# Patient Record
Sex: FEMALE | Race: BLACK OR AFRICAN AMERICAN | NOT HISPANIC OR LATINO | Employment: FULL TIME | URBAN - METROPOLITAN AREA
[De-identification: names, ages, dates, MRNs, and addresses within clinical notes are randomized per-mention and may not be internally consistent; named-entity substitution may affect disease eponyms.]

---

## 2022-02-11 ENCOUNTER — HOSPITAL ENCOUNTER (EMERGENCY)
Facility: CLINIC | Age: 32
Discharge: HOME | End: 2022-02-11
Attending: EMERGENCY MEDICINE | Admitting: EMERGENCY MEDICINE
Payer: MEDICAID

## 2022-02-11 ENCOUNTER — APPOINTMENT (OUTPATIENT)
Dept: CT IMAGING | Facility: CLINIC | Age: 32
End: 2022-02-11
Attending: EMERGENCY MEDICINE
Payer: MEDICAID

## 2022-02-11 ENCOUNTER — APPOINTMENT (OUTPATIENT)
Dept: ULTRASOUND IMAGING | Facility: CLINIC | Age: 32
End: 2022-02-11
Attending: EMERGENCY MEDICINE
Payer: MEDICAID

## 2022-02-11 VITALS
HEART RATE: 84 BPM | OXYGEN SATURATION: 100 % | RESPIRATION RATE: 16 BRPM | SYSTOLIC BLOOD PRESSURE: 121 MMHG | DIASTOLIC BLOOD PRESSURE: 73 MMHG

## 2022-02-11 DIAGNOSIS — N20.1 RIGHT URETERAL STONE: ICD-10-CM

## 2022-02-11 DIAGNOSIS — R10.9 FLANK PAIN: ICD-10-CM

## 2022-02-11 PROBLEM — E61.1 IRON DEFICIENCY: Status: ACTIVE | Noted: 2022-02-05

## 2022-02-11 PROBLEM — Z85.850 HISTORY OF MALIGNANT NEOPLASM OF THYROID: Status: ACTIVE | Noted: 2022-02-11

## 2022-02-11 PROBLEM — E66.9 OBESITY: Status: ACTIVE | Noted: 2022-02-11

## 2022-02-11 PROBLEM — N20.0 KIDNEY STONE: Status: ACTIVE | Noted: 2019-03-04

## 2022-02-11 PROBLEM — Z11.52 ENCOUNTER FOR SCREENING FOR COVID-19: Status: ACTIVE | Noted: 2022-02-11

## 2022-02-11 PROBLEM — Z86.19 HISTORY OF CHLAMYDIA: Status: ACTIVE | Noted: 2022-02-05

## 2022-02-11 PROBLEM — E89.0 S/P COMPLETE THYROIDECTOMY: Status: ACTIVE | Noted: 2017-06-09

## 2022-02-11 PROBLEM — G47.33 OBSTRUCTIVE SLEEP APNEA SYNDROME: Status: ACTIVE | Noted: 2022-02-11

## 2022-02-11 PROBLEM — D50.9 IRON DEFICIENCY ANEMIA: Status: ACTIVE | Noted: 2017-06-09

## 2022-02-11 PROBLEM — E03.9 HYPOTHYROIDISM: Status: ACTIVE | Noted: 2022-02-11

## 2022-02-11 PROBLEM — R23.3 EASY BRUISING: Status: ACTIVE | Noted: 2022-02-05

## 2022-02-11 PROBLEM — C73 PAPILLARY THYROID CARCINOMA (H): Status: ACTIVE | Noted: 2017-06-09

## 2022-02-11 LAB
ALBUMIN SERPL-MCNC: 3.6 G/DL (ref 3.5–5)
ALBUMIN UR-MCNC: 10 MG/DL
ALP SERPL-CCNC: 109 U/L (ref 45–120)
ALT SERPL W P-5'-P-CCNC: 31 U/L (ref 0–45)
ANION GAP SERPL CALCULATED.3IONS-SCNC: 9 MMOL/L (ref 5–18)
APPEARANCE UR: CLEAR
AST SERPL W P-5'-P-CCNC: 18 U/L (ref 0–40)
BILIRUB DIRECT SERPL-MCNC: 0.2 MG/DL
BILIRUB SERPL-MCNC: 0.5 MG/DL (ref 0–1)
BILIRUB UR QL STRIP: NEGATIVE
BUN SERPL-MCNC: 9 MG/DL (ref 8–22)
CALCIUM SERPL-MCNC: 9.2 MG/DL (ref 8.5–10.5)
CHLORIDE BLD-SCNC: 109 MMOL/L (ref 98–107)
CO2 SERPL-SCNC: 21 MMOL/L (ref 22–31)
COLOR UR AUTO: YELLOW
CREAT SERPL-MCNC: 0.82 MG/DL (ref 0.6–1.1)
ERYTHROCYTE [DISTWIDTH] IN BLOOD BY AUTOMATED COUNT: 12.8 % (ref 10–15)
GFR SERPL CREATININE-BSD FRML MDRD: >90 ML/MIN/1.73M2
GLUCOSE BLD-MCNC: 107 MG/DL (ref 70–125)
GLUCOSE UR STRIP-MCNC: NEGATIVE MG/DL
HCG UR QL: NEGATIVE
HCT VFR BLD AUTO: 44.7 % (ref 35–47)
HGB BLD-MCNC: 14.6 G/DL (ref 11.7–15.7)
HGB UR QL STRIP: ABNORMAL
HOLD SPECIMEN: NORMAL
INTERNAL QC OK POCT: NORMAL
KETONES UR STRIP-MCNC: 40 MG/DL
LEUKOCYTE ESTERASE UR QL STRIP: NEGATIVE
MCH RBC QN AUTO: 30 PG (ref 26.5–33)
MCHC RBC AUTO-ENTMCNC: 32.7 G/DL (ref 31.5–36.5)
MCV RBC AUTO: 92 FL (ref 78–100)
MUCOUS THREADS #/AREA URNS LPF: PRESENT /LPF
NITRATE UR QL: NEGATIVE
PH UR STRIP: 7 [PH] (ref 5–7)
PLATELET # BLD AUTO: 420 10E3/UL (ref 150–450)
POCT KIT EXPIRATION DATE: NORMAL
POCT KIT LOT NUMBER: NORMAL
POTASSIUM BLD-SCNC: 3.8 MMOL/L (ref 3.5–5)
PROT SERPL-MCNC: 7.6 G/DL (ref 6–8)
RBC # BLD AUTO: 4.87 10E6/UL (ref 3.8–5.2)
RBC URINE: 62 /HPF
SODIUM SERPL-SCNC: 139 MMOL/L (ref 136–145)
SP GR UR STRIP: 1.02 (ref 1–1.03)
SQUAMOUS EPITHELIAL: 2 /HPF
UROBILINOGEN UR STRIP-MCNC: <2 MG/DL
WBC # BLD AUTO: 13.2 10E3/UL (ref 4–11)
WBC URINE: 3 /HPF

## 2022-02-11 PROCEDURE — 76705 ECHO EXAM OF ABDOMEN: CPT

## 2022-02-11 PROCEDURE — 96374 THER/PROPH/DIAG INJ IV PUSH: CPT

## 2022-02-11 PROCEDURE — 81001 URINALYSIS AUTO W/SCOPE: CPT | Performed by: EMERGENCY MEDICINE

## 2022-02-11 PROCEDURE — 96375 TX/PRO/DX INJ NEW DRUG ADDON: CPT

## 2022-02-11 PROCEDURE — 36415 COLL VENOUS BLD VENIPUNCTURE: CPT | Performed by: EMERGENCY MEDICINE

## 2022-02-11 PROCEDURE — 82040 ASSAY OF SERUM ALBUMIN: CPT | Performed by: EMERGENCY MEDICINE

## 2022-02-11 PROCEDURE — 85014 HEMATOCRIT: CPT | Performed by: EMERGENCY MEDICINE

## 2022-02-11 PROCEDURE — 258N000003 HC RX IP 258 OP 636: Performed by: EMERGENCY MEDICINE

## 2022-02-11 PROCEDURE — 82310 ASSAY OF CALCIUM: CPT | Performed by: EMERGENCY MEDICINE

## 2022-02-11 PROCEDURE — 96376 TX/PRO/DX INJ SAME DRUG ADON: CPT

## 2022-02-11 PROCEDURE — 96361 HYDRATE IV INFUSION ADD-ON: CPT

## 2022-02-11 PROCEDURE — 81025 URINE PREGNANCY TEST: CPT | Performed by: EMERGENCY MEDICINE

## 2022-02-11 PROCEDURE — 99285 EMERGENCY DEPT VISIT HI MDM: CPT | Mod: 25

## 2022-02-11 PROCEDURE — 250N000011 HC RX IP 250 OP 636: Performed by: EMERGENCY MEDICINE

## 2022-02-11 PROCEDURE — 74176 CT ABD & PELVIS W/O CONTRAST: CPT

## 2022-02-11 RX ORDER — HYDROMORPHONE HYDROCHLORIDE 1 MG/ML
0.5 INJECTION, SOLUTION INTRAMUSCULAR; INTRAVENOUS; SUBCUTANEOUS ONCE
Status: COMPLETED | OUTPATIENT
Start: 2022-02-11 | End: 2022-02-11

## 2022-02-11 RX ORDER — HYDROMORPHONE HCL IN WATER/PF 6 MG/30 ML
0.5 PATIENT CONTROLLED ANALGESIA SYRINGE INTRAVENOUS
Status: DISCONTINUED | OUTPATIENT
Start: 2022-02-11 | End: 2022-02-11 | Stop reason: CLARIF

## 2022-02-11 RX ORDER — ONDANSETRON 2 MG/ML
4 INJECTION INTRAMUSCULAR; INTRAVENOUS ONCE
Status: COMPLETED | OUTPATIENT
Start: 2022-02-11 | End: 2022-02-11

## 2022-02-11 RX ORDER — KETOROLAC TROMETHAMINE 15 MG/ML
15 INJECTION, SOLUTION INTRAMUSCULAR; INTRAVENOUS ONCE
Status: COMPLETED | OUTPATIENT
Start: 2022-02-11 | End: 2022-02-11

## 2022-02-11 RX ORDER — HYDROMORPHONE HYDROCHLORIDE 1 MG/ML
0.5 INJECTION, SOLUTION INTRAMUSCULAR; INTRAVENOUS; SUBCUTANEOUS
Status: COMPLETED | OUTPATIENT
Start: 2022-02-11 | End: 2022-02-11

## 2022-02-11 RX ORDER — ONDANSETRON 4 MG/1
4 TABLET, ORALLY DISINTEGRATING ORAL EVERY 6 HOURS PRN
Qty: 10 TABLET | Refills: 0 | Status: SHIPPED | OUTPATIENT
Start: 2022-02-11

## 2022-02-11 RX ORDER — OXYCODONE HYDROCHLORIDE 5 MG/1
5 TABLET ORAL EVERY 6 HOURS PRN
Qty: 16 TABLET | Refills: 0 | Status: SHIPPED | OUTPATIENT
Start: 2022-02-11 | End: 2022-02-14

## 2022-02-11 RX ADMIN — HYDROMORPHONE HYDROCHLORIDE 0.5 MG: 1 INJECTION, SOLUTION INTRAMUSCULAR; INTRAVENOUS; SUBCUTANEOUS at 11:14

## 2022-02-11 RX ADMIN — KETOROLAC TROMETHAMINE 15 MG: 15 INJECTION, SOLUTION INTRAMUSCULAR; INTRAVENOUS at 09:45

## 2022-02-11 RX ADMIN — HYDROMORPHONE HYDROCHLORIDE 0.5 MG: 1 INJECTION, SOLUTION INTRAMUSCULAR; INTRAVENOUS; SUBCUTANEOUS at 09:58

## 2022-02-11 RX ADMIN — SODIUM CHLORIDE 1000 ML: 9 INJECTION, SOLUTION INTRAVENOUS at 09:44

## 2022-02-11 RX ADMIN — ONDANSETRON 4 MG: 2 INJECTION INTRAMUSCULAR; INTRAVENOUS at 11:13

## 2022-02-11 RX ADMIN — ONDANSETRON 4 MG: 2 INJECTION INTRAMUSCULAR; INTRAVENOUS at 09:44

## 2022-02-11 ASSESSMENT — ENCOUNTER SYMPTOMS
ABDOMINAL PAIN: 1
FLANK PAIN: 1
VOMITING: 1

## 2022-02-11 NOTE — ED TRIAGE NOTES
Pt arrives to ED with c/o sudden onset of flank pain, nausea, and vomiting this morning while on the way to work. Hx of kidney stones. Pt rates pain 8/10. Pt kneeling at bedside states she cannot lay in bed. Pt very restless.

## 2022-02-11 NOTE — ED PROVIDER NOTES
EMERGENCY DEPARTMENT ENCOUNTER      NAME: Mirian De  AGE: 32 year old female  YOB: 1990  MRN: [id number]  EVALUATION DATE & TIME: 2/11/2022  9:20 AM    PCP: No primary care provider on file.    ED PROVIDER: Vahe Vega M.D.      Chief Complaint   Patient presents with     Flank Pain         FINAL IMPRESSION:  1. Right ureteral stone    2. Flank pain          ED COURSE & MEDICAL DECISION MAKING:    Pertinent Labs & Imaging studies reviewed. (See chart for details)  ED Course as of 02/11/22 1223   Fri Feb 11, 2022   0941 Patient is a 32-year-old woman with history of kidney stones who presents with sudden onset of right flank pain an hour and a half ago.  Associated vomiting.  She is very uncomfortable on arrival here, kneeling on the floor.  She states that she has had a recent period, very unlikely that she is pregnant.  Plan to give IV analgesia, antiemetics, CT scan.   1032 On my initial read of CT, there appears to be 3.5 mm distal ureteral stone with hydro on the right side   1106 Hepatic function panel  normal   1107 Abd/pelvis CT no contrast - Stone Protocol  1.  Moderate right hydronephrosis secondary to a 2 mm UVJ stone.  2.  Multiple bilateral renal nonobstructive stones.  3.  Left inferior renal 11 mm hyperdense lesion is most likely a complex cyst. Ultrasound recommended to confirm.   1125 Abdomen US, limited (RUQ only)  1.  Cholecystectomy.      2.  No biliary dictation. The partially visualized common bile duct shows no filling defects.     3.  Moderate right hydronephrosis, as seen on same-day CT.     4.  Remaining exam negative.   1127 Patient feeling better after second dose of analgesia and antiemetics.  We discussed CT scan, ultrasound, and plan for discharge.  She was then home with NSAIDs, antibiotics, few doses of oxycodone and kidney stones institute follow-up     UA not c/w UTI    Additional ED Course Timestamps:  9:30 AM I met with patient for initial interview and  encounter. PPE worn includes N95 mask and face shield.    At the conclusion of the encounter I discussed the results of all of the tests and the disposition. The questions were answered. The patient or family acknowledged understanding and was agreeable with the care plan.       MEDICATIONS GIVEN IN THE EMERGENCY:  Medications   ketorolac (TORADOL) injection 15 mg (15 mg Intravenous Given 2/11/22 0945)   0.9% sodium chloride BOLUS (0 mLs Intravenous Stopped 2/11/22 1159)   ondansetron (ZOFRAN) injection 4 mg (4 mg Intravenous Given 2/11/22 0944)   HYDROmorphone (PF) (DILAUDID) injection 0.5 mg (0.5 mg Intravenous Given 2/11/22 0958)   HYDROmorphone (PF) (DILAUDID) injection 0.5 mg (0.5 mg Intravenous Given 2/11/22 1114)   ondansetron (ZOFRAN) injection 4 mg (4 mg Intravenous Given 2/11/22 1113)         NEW PRESCRIPTIONS STARTED AT TODAY'S ER VISIT  Discharge Medication List as of 2/11/2022 11:59 AM      START taking these medications    Details   ondansetron (ZOFRAN-ODT) 4 MG ODT tab Take 1 tablet (4 mg) by mouth every 6 hours as needed for nausea, Disp-10 tablet, R-0, E-Prescribe      oxyCODONE (ROXICODONE) 5 MG tablet Take 1 tablet (5 mg) by mouth every 6 hours as needed for pain, Disp-16 tablet, R-0, E-Prescribe                =================================================================    HPI    Patient information was obtained from: Patient     Use of : N/A         Mirian De is a 32 year old female with a pertinent history of kidney stones who presents to this ED for evaluation of flank pain.    Patient reports she had sudden onset of right flank pain earlier this morning while driving to work. States that she had one episode of emesis at work before leaving to present to the ED. She notes that she has not had a kidney stone for about a year, but her symptoms present very similar to her previous stones. She also notes a ~2 week history of RUQ pain which is separate from her current flank  pain. States that she has had her gallbladder removed. Patient denies any other complaints.               REVIEW OF SYSTEMS   Review of Systems   Gastrointestinal: Positive for abdominal pain (RUQ, 2 weeks) and vomiting.   Genitourinary: Positive for flank pain (right sided).   All other systems reviewed and are negative.       PAST MEDICAL HISTORY:  History reviewed. No pertinent past medical history.    PAST SURGICAL HISTORY:  History reviewed. No pertinent surgical history.        CURRENT MEDICATIONS:    No current facility-administered medications for this encounter.     Current Outpatient Medications   Medication     ondansetron (ZOFRAN-ODT) 4 MG ODT tab     oxyCODONE (ROXICODONE) 5 MG tablet       ALLERGIES:  No Known Allergies    FAMILY HISTORY:  No family history on file.    SOCIAL HISTORY:   Social History     Socioeconomic History     Marital status: Not on file     Spouse name: Not on file     Number of children: Not on file     Years of education: Not on file     Highest education level: Not on file   Occupational History     Not on file   Tobacco Use     Smoking status: Not on file     Smokeless tobacco: Not on file   Substance and Sexual Activity     Alcohol use: Not on file     Drug use: Not on file     Sexual activity: Not on file   Other Topics Concern     Not on file   Social History Narrative     Not on file     Social Determinants of Health     Financial Resource Strain: Not on file   Food Insecurity: Not on file   Transportation Needs: Not on file   Physical Activity: Not on file   Stress: Not on file   Social Connections: Not on file   Intimate Partner Violence: Not on file   Housing Stability: Not on file       VITALS:  /73   Pulse 84   Resp 16   LMP 01/17/2022 (Exact Date)   SpO2 100%     PHYSICAL EXAM    Constitutional: Well developed, well nourished. Kneeling on floor grimacing in pain   HENT: Normocephalic, atraumatic, mucous membranes moist, nose normal. Neck- Supple, gross ROM  intact.   Eyes: Pupils mid-range, conjunctiva without injection, no discharge.   Respiratory: Clear to auscultation bilaterally, no respiratory distress, no wheezing, speaks full sentences easily. No cough.  Cardiovascular: Normal heart rate, regular rhythm, no murmurs. No pedal edema, 2+ DP pulses.   GI: Soft, right upper quadrant tenderness, no masses.   Musculoskeletal: Moving all 4 extremities intentionally and without pain. No obvious deformity.  Skin: Warm, dry, no rash.  Neurologic: Alert & oriented x 3, cranial nerves grossly intact.  Psychiatric: Affect normal, cooperative.       LAB:  All pertinent labs reviewed and interpreted.  Labs Ordered and Resulted from Time of ED Arrival to Time of ED Departure   BASIC METABOLIC PANEL - Abnormal       Result Value    Sodium 139      Potassium 3.8      Chloride 109 (*)     Carbon Dioxide (CO2) 21 (*)     Anion Gap 9      Urea Nitrogen 9      Creatinine 0.82      Calcium 9.2      Glucose 107      GFR Estimate >90     CBC WITH PLATELETS - Abnormal    WBC Count 13.2 (*)     RBC Count 4.87      Hemoglobin 14.6      Hematocrit 44.7      MCV 92      MCH 30.0      MCHC 32.7      RDW 12.8      Platelet Count 420     HEPATIC FUNCTION PANEL - Normal    Bilirubin Total 0.5      Bilirubin Direct 0.2      Protein Total 7.6      Albumin 3.6      Alkaline Phosphatase 109      AST 18      ALT 31     HCG QUALITATIVE URINE POCT - Normal    HCG Qual Urine Negative      Internal QC Check POCT Valid      POCT Kit Lot Number [id number]      POCT Kit Expiration Date 05/31/2023         RADIOLOGY:  Reviewed all pertinent imaging. Please see official radiology report.  Abdomen US, limited (RUQ only)   Final Result   IMPRESSION:      1.  Cholecystectomy.       2.  No biliary dictation. The partially visualized common bile duct shows no filling defects.      3.  Moderate right hydronephrosis, as seen on same-day CT.      4.  Remaining exam negative.            Abd/pelvis CT no contrast - Stone  Protocol   Final Result   IMPRESSION:    1.  Moderate right hydronephrosis secondary to a 2 mm UVJ stone.   2.  Multiple bilateral renal nonobstructive stones.   3.  Left inferior renal 11 mm hyperdense lesion is most likely a complex cyst. Ultrasound recommended to confirm.             EKG:    All EKG interpretations will be found in ED course above.      I, Salvador Grigsby am serving as a scribe to document services personally performed by Dr. Vahe Vega based on my observation and the provider's statements to me. I, Vahe Vega MD attest that Salvador Grigsby is acting in a scribe capacity, has observed my performance of the services and has documented them in accordance with my direction.    Vahe Vega M.D.  Emergency Medicine  [facility] EMERGENCY ROOM  [address] [facility] [id number]  [phone]  Dept: [phone]     Vahe Vega MD  02/11/22 1224

## 2022-02-11 NOTE — DISCHARGE INSTRUCTIONS
I recommend taking 600 mg of ibuprofen 3 times a day.  You can also take 1000 mg of Tylenol 3 times a day.  It is okay to take these at the same time.  After 2 days, please take the medication only as needed.    Take the oxycodone as needed for breakthrough pain.  If you are not able to tolerate the pain or have persistent vomiting despite medications please come back to the emergency department for IV treatment.    Drink plenty of water over the next few days.  Summary from the kidney stone [place] should call you Monday or early in the week to set up an appointment, and check on your symptoms.

## 2022-02-14 ENCOUNTER — VIRTUAL VISIT (OUTPATIENT)
Dept: UROLOGY | Facility: CLINIC | Age: 32
End: 2022-02-14
Payer: MEDICAID

## 2022-02-14 DIAGNOSIS — N20.0 CALCULUS OF KIDNEY: ICD-10-CM

## 2022-02-14 DIAGNOSIS — N13.2 HYDRONEPHROSIS WITH URINARY OBSTRUCTION DUE TO URETERAL CALCULUS: ICD-10-CM

## 2022-02-14 DIAGNOSIS — N20.0 CALCULUS OF KIDNEY: Primary | ICD-10-CM

## 2022-02-14 DIAGNOSIS — N20.1 CALCULUS OF URETER: Primary | ICD-10-CM

## 2022-02-14 DIAGNOSIS — R10.9 ACUTE RIGHT FLANK PAIN: ICD-10-CM

## 2022-02-14 PROCEDURE — 99203 OFFICE O/P NEW LOW 30 MIN: CPT | Mod: 95 | Performed by: PHYSICIAN ASSISTANT

## 2022-02-14 RX ORDER — LIRAGLUTIDE 6 MG/ML
1.8 INJECTION, SOLUTION SUBCUTANEOUS
COMMUNITY
Start: 2021-10-01

## 2022-02-14 RX ORDER — LEVOTHYROXINE SODIUM 112 UG/1
TABLET ORAL
COMMUNITY
Start: 2021-07-20

## 2022-02-14 RX ORDER — LANOLIN ALCOHOL/MO/W.PET/CERES
1 CREAM (GRAM) TOPICAL
COMMUNITY

## 2022-02-14 ASSESSMENT — PAIN SCALES - GENERAL: PAINLEVEL: MODERATE PAIN (4)

## 2022-02-14 NOTE — PATIENT INSTRUCTIONS
Patient Stated Goal: Pass my stone  Patient Stated Goal: Prevent further stones  Symptom Control While Passing A Stone    The goal of [facility] is to let a smaller kidney stone (less than 4 to 5 mm) pass without intervention if possible. Giving your body a chance to clear the stone may take a few hours up to a few weeks.  Keeping you well-informed, safe and fairly comfortable is important.    Drink to thirst  Do not attempt to  flush out  your stone by drinking too much fluid. This does not work and may increase nausea. Drink enough to satisfy your body s thirst. Eating your normal diet is fine.   Medications (that may be suggested or prescribed)    Ibuprofen (Advil or Motrin) Available over the counter  o Take two (200mg) tablets every six hours until the stone passes.  o Prevents spasm of the ureter.    o Decreases pain.      Dramamine* (drowsy version, non-generic formulation) Available over the counter  o Take 50mg at bedtime  o Decreases spasms of the ureter  o Decreases nausea  o Decreases acute pain  o Decreases recurrence of pain for 24 hours  o Will help you sleep  *This medication will cause increase drowsiness, do not drive or operate machinery for 6 hours.      Narcotics (Percocet, Vicodin, Dilaudid) Take as prescribed for severe pain unrelieved by ibuprofen and Dramamine  o Narcotics have significant side effects and only  cover-up  pain. They have no effect on the cause of pain.  o Common side effects  - Confusion, disorientation and sedation - DO NOT DRIVE OR OPERATE MACHINERY WITHIN 24 HOURS  - Nausea - take Dramamine or Zofran or Haldol to help control  - Constipation  - Sleep disturbances      Ondansetron (Zofran) Take as prescribed  o Reserve for severe nausea  o May cause constipation, start over the counter Miralax if needed      Second Line Anti-Nausea Medication: Adding a different anti-nausea medication maybe helpful for persistent nausea.  The combined effect of different  types of anti-nausea medications maybe more effective than either medication by itself, even in higher doses.  o Compazine: Take as prescribed      Information about kidney stones    Crystals can form if chemicals are too concentrated in your urine. If the crystal grows over time, a stone may form. A stone usually isn t painful while it is still in the kidney.    As the stone begins to leave the kidney, you may experience episodes of flank pain as the kidney stone approaches the entrance to the ureter. Some people feel a vague ache in the side.    Kidney stones may fall into the ureter. Some stones are tiny and pass through without causing symptoms. The ureter is a small tube (approximately 1/8 of an inch wide). A kidney stone can get stuck and block the ureter. If this happens, urine backs up and flows back to the kidney. Back pressure on the kidney can cause:  o Severe flank pain radiating to the groin.  o Severe nausea and vomiting.  o The pain can occur in the lower back, side, groin or all three.      When the stone reaches the lower ureter, this can irritate the bladder and sensations of feeling the urge to urinate frequently and urgently may occur.      Once the stone passes out of your ureter and into your bladder, the symptoms of urgency and frequency will often disappear. Sometimes pain will come back for a short period and will not be as severe as before. The passage of the stone from your bladder and out of your body is usually not a problem. The urethra is at least twice as wide as the normal ureter, so the stone doesn t usually block it.    Strain all urine  If you pass the stone, save it. Place it in the container we have provided and bring it to the [facility] within a week of passing it. Your stone will then be sent for analysis which takes about a month.     Signs and symptoms you might experience    Nausea    Decreased appetite    Urinary frequency    Bloody  urine     Chills    Fatigue    When to call [facility] or go to the Emergency Room    Fever with a temperature greater than 100.1    Severe pain    Persistent nausea/vomiting    If the pain worsens or nausea/vomiting is uncontrolled with medications, STOP eating & drinking. You need to have an empty stomach for 8 hours prior to surgery. Call the [facility] immediately at [phone].           Follow-up    Low dose CT scan with doctor visit 1-2 weeks after initial clinic visit per doctor s instructions    Please cancel the CT scan visit if you pass a stone. Reschedule for a one month follow-up with doctor to discuss stone composition and future prevention.    Preventing future stones    Approximately a month after your stone is sent out for analysis, a prevention visit will occur with your provider, to discuss an individualized plan for prevention of new stones and to discuss managing stones that you may still have. Along with the analysis of the kidney stone, blood and urine tests may be indicated to develop this plan. Knowing the type of kidney stones you make, and why, allows the providers at the [facility] to recommend specific ways to prevent them.    Follow-up visits are an important part of monitoring and preventing future re-occurrences.    The [facility] is available for questions or concerns 24 hours a day at [phone]

## 2022-02-14 NOTE — Clinical Note
Renal US in 2 weeks to check on left renal cyst and right hydro  Patient to proceed with 24 hour urine collection pickup-please call to verify location

## 2022-02-14 NOTE — PROGRESS NOTES
Assessment/Plan:    Assessment & Plan   Mirian was seen today for follow up.    Diagnoses and all orders for this visit:    Calculus of ureter  -     US Renal Complete; Future  -     Stone analysis [MWN211]; Future  -     Patient Stated Goal: Pass my stone    Acute right flank pain    Hydronephrosis with urinary obstruction due to ureteral calculus    Calculus of kidney  -     Patient Stated Goal: Prevent further stones    Other orders  -     Cancel: Adult Urology Referral        Stone Management Plan  Stone Management 2/14/2022   Urinary Tract Infection No suspicion of infection   Renal Colic Well controlled symptoms   Renal Failure No suspicion of renal failure   Current CT date 2/11/2022   Right sided stones? Yes   R Number of ureteral stones 1   R GSD of ureteral stones 2   R Location of ureteral stone Distal   R Number of kidney stones  5   R GSD of kidney stones < 2   R Hydronephrosis Moderate   R Stone Event New event   Diagnosis date 2/11/2022   Initial location of primary symptomatic stone Distal   Initial GSD of primary symptomatic stone 2   R MET status Initiation   R Current Plan MET   MET 2 week F/U   Left sided stones? Yes   L Number of ureteral stones No ureteral stones   L Number of kidney stones  5   L GSD of kidney stones 4 - 10   L Hydronephrosis None   L Stone Event No current event   L Current Plan Observe   Observe rationale Significant stone burden amenable to emergency management         PLAN    31 yo F with history of kidney stones, previous surgical stone intervention. Recent ER visit for symptomatic, obstructing right UVJ stone, symptoms currently controlled. Multiple, non-obstructing renal stones with dominant left renal stone. Incidental, probable left renal cysts.    Will proceed with medical expulsive therapy. Risks and benefits were detailed of medical expulsive therapy including probability of stone passage, recurrent renal colic, and requirement of emergency medical and/or surgical  care and further imaging. Patient verbalized understanding. Patient agrees with plan as discussed. She will return in 2 weeks with renal US to further characterize left renal cyst. Will also secure 24 hour urine collections x 2 to initiate prevention workup.    For symptom control, she was prescribed oxycodone and ondansetron. Over the counter symptom control medications of ibuprofen, Dramamine and Tylenol were recommended.    Video call duration: 12 minutes  17 minutes spent on the date of the encounter doing chart review, history and exam, documentation and further activities per the note    Martha Arrington PA-C  [facility]    Subjective:     HPI  Ms. Mirian De is a 32 year old  female who is being evaluated via a billable video visit by [facility] following [facility] ER visit for urolithiasis.    She is an unidentified composition stone former who has required stone clearance procedure x 1 in 2018 ([facility]). She has not previously participated in stone risk evaluation. She has no identified modifiable stone risk factors. She has identified non-modifiable stone risks including:  limited family history and multiple stones at presentation.    She was evaluated in ER 2/11/22 for acute onset right flank pain x few hours in duration. The pain was unprovoked, intermittent and worsened since onset. It was sharp and nonradiating. Significant associated symptoms at presentation included:  vomiting. She reported similar symptoms with prior kidney stones, last occurrence ~ 1 year ago. Workup was notable for CT reporting an obstructing right UVJ stone. Labs were negative for infection or renal injury. She was sent home with zofran and oxycodone.    She is still having right flank pain but is at work. She notes associated urinary frequency. Pertinent negative current symptoms include:  fever, chills, left flank pain, nausea, vomiting, hematuria and  dysuria.     CT scan from 2/11/22 is personally reviewed and demonstrates moderate right hydronephrosis secondary to a 2 mm UVJ stone. There is a dominant 10 x 6 mm left upper pole renal stone. Additional, multiple, bilateral, nonobstructing tiny renal stone, right > left.  Radiologist notes probable 11 mm complex left renal cyst     Significant labs from presentation include moderate hematuria, no pyuria, negative nitrite, elevated WBC, normal creatinine and normal potassium.    ROS   A 12 point comprehensive review of systems is negative except for HPI    No past medical history on file.    No past surgical history on file.    Current Outpatient Medications   Medication Sig Dispense Refill     levothyroxine (SYNTHROID/LEVOTHROID) 112 MCG tablet        liraglutide - Weight Management (SAXENDA) 18 MG/3ML pen Inject 1.8 mg Subcutaneous       melatonin 3 MG tablet Take 1 mg by mouth nightly as needed for sleep       ondansetron (ZOFRAN-ODT) 4 MG ODT tab Take 1 tablet (4 mg) by mouth every 6 hours as needed for nausea (Patient not taking: Reported on 2/14/2022) 10 tablet 0     oxyCODONE (ROXICODONE) 5 MG tablet Take 1 tablet (5 mg) by mouth every 6 hours as needed for pain (Patient not taking: Reported on 2/14/2022) 16 tablet 0       No Known Allergies    Social History     Socioeconomic History     Marital status: Single     Spouse name: Not on file     Number of children: Not on file     Years of education: Not on file     Highest education level: Not on file   Occupational History     Not on file   Tobacco Use     Smoking status: Not on file     Smokeless tobacco: Not on file   Substance and Sexual Activity     Alcohol use: Not on file     Drug use: Not on file     Sexual activity: Not on file   Other Topics Concern     Not on file   Social History Narrative     Not on file     Social Determinants of Health     Financial Resource Strain: Not on file   Food Insecurity: Not on file   Transportation Needs: Not on file    Physical Activity: Not on file   Stress: Not on file   Social Connections: Not on file   Intimate Partner Violence: Not on file   Housing Stability: Not on file       No family history on file.    Objective:     No vitals or physical exam obtained due to virtual visit    LABS  Most Recent 3 CBC's:  Recent Labs   Lab Test 02/11/22  0943   WBC 13.2*   HGB 14.6   MCV 92        Most Recent 3 BMP's:  Recent Labs   Lab Test 02/11/22  0943      POTASSIUM 3.8   CHLORIDE 109*   CO2 21*   BUN 9   CR 0.82   ANIONGAP 9   RENU 9.2        Most Recent Urinalysis:  Recent Labs   Lab Test 02/11/22  1155   COLOR Yellow   APPEARANCE Clear   URINEGLC Negative   URINEBILI Negative   URINEKETONE 40 *   SG 1.020   UBLD 0.5 mg/dL*   URINEPH 7.0   PROTEIN 10 *   NITRITE Negative   LEUKEST Negative   RBCU 62*   WBCU 3

## 2022-02-14 NOTE — PROGRESS NOTES
Patient states that they are in [place] at the time of their visit.  Patient is aware telehealth visit is billable and agrees to proceed.  Tamiko Cortez RN

## 2022-02-15 ENCOUNTER — TELEPHONE (OUTPATIENT)
Dept: UROLOGY | Facility: CLINIC | Age: 32
End: 2022-02-15
Payer: MEDICAID

## 2022-02-15 NOTE — TELEPHONE ENCOUNTER
Patient telephoned stating she is in severe pain on the right side.  The pain started again last night.  She had to take 1,000mg with 600mg ibuprofen and 2 oxycodone to ease the pain.  This morning, her pain is increasing again and considering going into ER.  She is at work and cannot take oxycodone.  Recommend patient should consider taking work off to take oxycodone so therefore can help with her pain and avoid ER if at all possible.  Per Martha Arrington PA-C she has a good prognosis of passing the 2mm uvj stone.  Patient understood with recommendations and will try.  Will contact [facility] with updates and concerns.

## 2022-02-16 ENCOUNTER — LAB (OUTPATIENT)
Dept: LAB | Facility: CLINIC | Age: 32
End: 2022-02-16
Payer: MEDICAID

## 2022-02-16 DIAGNOSIS — N20.1 CALCULUS OF URETER: ICD-10-CM

## 2022-02-16 DIAGNOSIS — N20.0 CALCULUS OF KIDNEY: ICD-10-CM

## 2022-02-21 ENCOUNTER — LAB (OUTPATIENT)
Dept: LAB | Facility: CLINIC | Age: 32
End: 2022-02-21
Payer: MEDICAID

## 2022-02-21 DIAGNOSIS — N20.0 CALCULUS OF KIDNEY: ICD-10-CM

## 2022-02-21 LAB
Lab: NORMAL
PERFORMING LABORATORY: NORMAL
SPECIMEN STATUS: NORMAL
TEST NAME: NORMAL

## 2022-02-21 PROCEDURE — 82340 ASSAY OF CALCIUM IN URINE: CPT

## 2022-02-21 PROCEDURE — 83945 ASSAY OF OXALATE: CPT

## 2022-02-21 PROCEDURE — 84999 UNLISTED CHEMISTRY PROCEDURE: CPT

## 2022-02-21 PROCEDURE — 84560 ASSAY OF URINE/URIC ACID: CPT

## 2022-02-21 PROCEDURE — 82507 ASSAY OF CITRATE: CPT

## 2022-02-24 ENCOUNTER — HOSPITAL ENCOUNTER (OUTPATIENT)
Dept: ULTRASOUND IMAGING | Facility: CLINIC | Age: 32
Discharge: HOME | End: 2022-02-24
Attending: PHYSICIAN ASSISTANT | Admitting: PHYSICIAN ASSISTANT
Payer: MEDICAID

## 2022-02-24 DIAGNOSIS — N20.1 CALCULUS OF URETER: ICD-10-CM

## 2022-02-24 PROCEDURE — 76770 US EXAM ABDO BACK WALL COMP: CPT

## 2022-02-25 LAB — MISCELLANEOUS TEST 1 (ARUP): ABNORMAL

## 2022-02-28 ENCOUNTER — TELEPHONE (OUTPATIENT)
Dept: UROLOGY | Facility: CLINIC | Age: 32
End: 2022-02-28

## 2022-02-28 ENCOUNTER — VIRTUAL VISIT (OUTPATIENT)
Dept: UROLOGY | Facility: CLINIC | Age: 32
End: 2022-02-28
Payer: MEDICAID

## 2022-02-28 DIAGNOSIS — R82.994 HYPERCALCIURIA: Primary | ICD-10-CM

## 2022-02-28 DIAGNOSIS — N20.0 CALCULUS OF KIDNEY: ICD-10-CM

## 2022-02-28 PROCEDURE — 99213 OFFICE O/P EST LOW 20 MIN: CPT | Mod: 95 | Performed by: PHYSICIAN ASSISTANT

## 2022-02-28 ASSESSMENT — PAIN SCALES - GENERAL: PAINLEVEL: NO PAIN (0)

## 2022-02-28 NOTE — PROGRESS NOTES
Patient is roomed via telephone for a virtual visit.  Patient confirmed she is in the [facility] at the time of this appointment.  Patient understands that this virtual visit is billable and agree to proceed with appointment.

## 2022-02-28 NOTE — Clinical Note
Patient requests labs today at [facility], ionized Ca and vitamin D  Needs a LITHOLINK x 1 to complete once regains insurance-drops end of today

## 2022-02-28 NOTE — PROGRESS NOTES
Assessment/Plan:    Assessment & Plan   Mirian was seen today for follow up.    Diagnoses and all orders for this visit:    Hypercalciuria  -     Cancel: CALCIUM, IONIZED, SERUM  -     Patient Stated Goal: Prevent further stones    Calculus of kidney    Stone Management Plan  Stone Management 2/14/2022 2/28/2022   Urinary Tract Infection No suspicion of infection No suspicion of infection   Renal Colic Well controlled symptoms Asymptomatic at this time   Renal Failure No suspicion of renal failure No suspicion of renal failure   Current CT date 2/11/2022 -   Right sided stones? Yes -   R Number of ureteral stones 1 -   R GSD of ureteral stones 2 -   R Location of ureteral stone Distal -   R Number of kidney stones  5 -   R GSD of kidney stones < 2 -   R Hydronephrosis Moderate None   R Stone Event New event Resolved event   Diagnosis date 2/11/2022 -   Initial location of primary symptomatic stone Distal -   Initial GSD of primary symptomatic stone 2 -   Resolved date - 2/24/2022   R MET status Initiation Passed   R Current Plan MET Observe   MET 2 week F/U -   Observe rationale - Limited stone burden with good prognosis for spontaneous passage   Left sided stones? Yes -   L Number of ureteral stones No ureteral stones -   L Number of kidney stones  5 -   L GSD of kidney stones 4 - 10 -   L Hydronephrosis None None   L Stone Event No current event No current event   L Current Plan Observe Observe   Observe rationale Significant stone burden amenable to emergency management Significant stone burden amenable to emergency management             PLAN    33 yo F with history of kidney stones, previous surgical stone intervention. Interval passage of right UVJ stone, no specimen retrieved. Multiple, non-obstructing renal stones with dominant left renal stone. Incidental, probable left renal cysts. Initial risk workup notable for indeterminate hypercalciuria.    She is congratulated on passing her stone. Issues with initial  prevention testing including patient was given only 1 not 2 urine kits and had to ask for a toilet hat. Processing of 24 hour urine was done via another order instead of one provider authorized.     Recommend obtaining serum stone risk chemistries including vitamin D, parathyroid hormone and ionized calcium. She would like to do those through [facility] today as her current insurance falls of after today. One 24 hour urine collection and dietary journal will be obtained but will hold off on this until she can re-establish insurance coverage.    Video call duration: 9 minutes  14 minutes spent on the date of the encounter doing chart review, history and exam, documentation and further activities per the note    Martha Arrington PA-C  [facility]    HPI  Ms. Mirian De is a 32 year old  female who is being evaluated via a billable video visit by [facility] for medical expulsive therapy follow up.     On last encounter, her 2 mm stone was in right distal ureter with moderate hydronephrosis.    Symptoms were intermittent with initial severe right renal colic that improved. She notes another bout of moderate pain 2 days ago in th right upper back. She is asymptomatic at present. She denies symptoms of fever, chills, flank pain, nausea, vomiting, urinary frequency and dysuria.     Updated renal US from 2/24/22 is personally reviewed and demonstrates resolution of previous right hydronephrosis. No other findings noted.     24 hour urine collection from 2/21/22 demonstrates creatinine (2176 mg), calcium (304 mg), citric acid (712 mg), oxalate (10 mg), uric acid (746 mg). This was processed through [facility] and is not urine test authorizing provider ordered as other markers are missing (ie, volume, sodium and urine pH).    ROS   Review of systems is negative except for HPI.    No past medical history on file.    No past surgical  history on file.    Current Outpatient Medications   Medication Sig Dispense Refill     levothyroxine (SYNTHROID/LEVOTHROID) 112 MCG tablet        liraglutide - Weight Management (SAXENDA) 18 MG/3ML pen Inject 1.8 mg Subcutaneous       melatonin 3 MG tablet Take 1 mg by mouth nightly as needed for sleep       ondansetron (ZOFRAN-ODT) 4 MG ODT tab Take 1 tablet (4 mg) by mouth every 6 hours as needed for nausea (Patient not taking: Reported on 2/14/2022) 10 tablet 0       No Known Allergies    Social History     Socioeconomic History     Marital status: Single     Spouse name: Not on file     Number of children: Not on file     Years of education: Not on file     Highest education level: Not on file   Occupational History     Not on file   Tobacco Use     Smoking status: Not on file     Smokeless tobacco: Not on file   Substance and Sexual Activity     Alcohol use: Not on file     Drug use: Not on file     Sexual activity: Not on file   Other Topics Concern     Not on file   Social History Narrative     Not on file     Social Determinants of Health     Financial Resource Strain: Not on file   Food Insecurity: Not on file   Transportation Needs: Not on file   Physical Activity: Not on file   Stress: Not on file   Social Connections: Not on file   Intimate Partner Violence: Not on file   Housing Stability: Not on file       No family history on file.    Objective:     Appears AAO x 3  No vitals obtained due to virtual visit    Stone prevention labs 24 hour urine No results found for: [id number], CALCIUMUR, [id number], LABPH

## 2022-02-28 NOTE — PATIENT INSTRUCTIONS
Patient Stated Goal: Prevent further stones  Steps for collecting a 24 hour urine specimen    Please follow the directions carefully. All urine voided for a 24-hour period needs to be collected into the jug.   Pick the most convenient day with your schedule, perhaps on a weekend or a day off.    Restrict dietary intake of sodium to 1800mg per day or less for 4 consecutive days.    Start the 24 hour urine collection on day 4 of the sodium restrictive diet.    Start your Diet Log the day before collection and continue on the day of urine collection.  You MUST bring Diet Log with you on follow up visit to discuss results.    One 24hr urine collection while on dietary sodium restriction challenge    STEP 1  Empty your bladder completely into the toilet. This will be your start time. Write your full legal name, start date and time on the jug label.  Collection start and stop times need to match exactly!  For example:  6 am to 6 am.    STEP 2  The next time you urinate, empty your bladder directly into the jug or collection hat and pour urine into the jug.  Screw the lid back onto the jug.  Do not spill!    STEP 3  Place the jug in the refrigerator or a cooler with ice during the collection period.  Failure to keep it cool could cause inaccurate test results. DO NOT Freeze.     STEP 4  Continue collecting all urine into the jug for the rest of the day, for the full 24 hours.  DO NOT stop early or go over 24 hours!    STEP 5  Exactly 24 hours from start of collection, write your full legal name, stop date and time on the jug label.   Collection start and stop times need to match exactly!  For example:  6 am to 6 am.  Failure to label correctly will result in recollection of urine specimen.    STEP 6  Return each jug within 24 hours after final urination.    STEP 7  Drop off jug locations:   Mail back as instructed  STEP 8  Please call [facility] after return of your final jug to schedule your follow-up visit.  [phone]      FOODS RICH IN SODIUM/SALT    If you eat too much salt or sodium, you may increase the calcium in your urine.  That may cause stones to form.  A low sodium/salt diet is recommended to reduce excess urinary calcium excretion that can potentially form a kidney stone.  The recommended sodium intake by [employer] is less than 2500 milligrams per day.     You should usually avoid these items:    ? Salt - One teaspoon of table salt has 2400 milligrams of sodium  ? Processed foods - salt is added in large amounts to some regular foods    ? Examples are:      Canned foods - soups, stews, sauces, gravy mixes, and some vegetables      Frozen foods - dinners, entrees, vegetables with sauces      Snack foods - salted chips, popcorn, pretzels, pork rinds and crackers      Packaged starchy foods - seasoned noodle or rice dishes, stuffing mix, macaroni and cheese dinner      Instant cooking foods to which you add hot water and stir - potatoes, cereals, noodles, etc.       (salt is added to make precooked foods absorb water faster)      Mixes - cornbread, biscuit, cake or pudding      Meats and cheeses  - Deli or lunch meats - bologna, ham, turkey, roast beef, etc.  - Cured or smoked meats - corned beef, haynes, sausage of any kind  (hugh, link, Kielbasa, Telugu, wieners or hot dogs)  - Canned meats - potted meats, spreads, [place] sausage, etc.  - Cheeses - read labels and avoid those with more than 140 mg sodium per serving;  - Examples are:       American cheese      Velveeta        Ashleyez Whiz      ? Condiments, Sauces and Seasonings        Mustard, ketchup, salad dressings, bouillon cubes or granules      Sauces - Worcestershire, barbecue, pizza, chili, steak, soy, or horseradish sauce      Meat tenderizer, monosodium glutamate      Any seasoning that has  salt  in the name or on the label;  -  Avoid celery, garlic and onion salt; however use garlic or onion powder or flakes instead       Pickles and olives    What  can you use to season you food?         Tart - try lemon or lime juice, vinegar      Hot - peppers are low in sodium; hot sauce has salt, but using just a drop or two will not add        up to much       Herbs and spices - onions, garlic, salt-free seasonings

## 2022-04-03 ENCOUNTER — HEALTH MAINTENANCE LETTER (OUTPATIENT)
Age: 32
End: 2022-04-03

## 2022-10-03 ENCOUNTER — HEALTH MAINTENANCE LETTER (OUTPATIENT)
Age: 32
End: 2022-10-03

## 2023-05-21 ENCOUNTER — HEALTH MAINTENANCE LETTER (OUTPATIENT)
Age: 33
End: 2023-05-21

## 2024-07-28 ENCOUNTER — HEALTH MAINTENANCE LETTER (OUTPATIENT)
Age: 34
End: 2024-07-28